# Patient Record
Sex: FEMALE | Race: WHITE | NOT HISPANIC OR LATINO | URBAN - METROPOLITAN AREA
[De-identification: names, ages, dates, MRNs, and addresses within clinical notes are randomized per-mention and may not be internally consistent; named-entity substitution may affect disease eponyms.]

---

## 2023-11-03 DIAGNOSIS — F41.9 ANXIETY: Primary | ICD-10-CM

## 2023-11-03 RX ORDER — ALPRAZOLAM 0.5 MG/1
0.5 TABLET ORAL 3 TIMES DAILY PRN
Qty: 90 TABLET | Refills: 5 | Status: SHIPPED | OUTPATIENT
Start: 2023-11-03 | End: 2024-05-08 | Stop reason: SDUPTHER

## 2023-11-03 RX ORDER — ALPRAZOLAM 0.5 MG/1
0.5 TABLET ORAL 3 TIMES DAILY PRN
COMMUNITY
End: 2023-11-03 | Stop reason: SDUPTHER

## 2024-02-16 DIAGNOSIS — I10 HYPERTENSION, UNSPECIFIED TYPE: Primary | ICD-10-CM

## 2024-02-16 DIAGNOSIS — B00.9 HSV-2 (HERPES SIMPLEX VIRUS 2) INFECTION: ICD-10-CM

## 2024-02-16 DIAGNOSIS — F41.9 ANXIETY: ICD-10-CM

## 2024-02-16 RX ORDER — VENLAFAXINE HYDROCHLORIDE 75 MG/1
75 CAPSULE, EXTENDED RELEASE ORAL DAILY
COMMUNITY
End: 2024-02-16 | Stop reason: SDUPTHER

## 2024-02-16 RX ORDER — LOSARTAN POTASSIUM 50 MG/1
50 TABLET ORAL DAILY
COMMUNITY
End: 2024-02-16 | Stop reason: SDUPTHER

## 2024-02-16 RX ORDER — VALACYCLOVIR HYDROCHLORIDE 500 MG/1
500 TABLET, FILM COATED ORAL DAILY
Qty: 90 TABLET | Refills: 3 | Status: SHIPPED | OUTPATIENT
Start: 2024-02-16

## 2024-02-16 RX ORDER — VENLAFAXINE HYDROCHLORIDE 75 MG/1
75 CAPSULE, EXTENDED RELEASE ORAL DAILY
Qty: 90 CAPSULE | Refills: 3 | Status: SHIPPED | OUTPATIENT
Start: 2024-02-16

## 2024-02-16 RX ORDER — LOSARTAN POTASSIUM 50 MG/1
50 TABLET ORAL DAILY
Qty: 90 TABLET | Refills: 3 | Status: SHIPPED | OUTPATIENT
Start: 2024-02-16

## 2024-02-16 RX ORDER — HYDROCHLOROTHIAZIDE 50 MG/1
50 TABLET ORAL DAILY
COMMUNITY
End: 2024-02-16 | Stop reason: SDUPTHER

## 2024-02-16 RX ORDER — HYDROCHLOROTHIAZIDE 50 MG/1
50 TABLET ORAL DAILY
Qty: 90 TABLET | Refills: 3 | Status: SHIPPED | OUTPATIENT
Start: 2024-02-16

## 2024-02-16 RX ORDER — VALACYCLOVIR HYDROCHLORIDE 500 MG/1
500 TABLET, FILM COATED ORAL DAILY
COMMUNITY
End: 2024-02-16 | Stop reason: SDUPTHER

## 2024-05-08 DIAGNOSIS — F41.9 ANXIETY: ICD-10-CM

## 2024-05-08 RX ORDER — ALPRAZOLAM 0.5 MG/1
0.5 TABLET ORAL 3 TIMES DAILY PRN
Qty: 90 TABLET | Refills: 5 | Status: SHIPPED | OUTPATIENT
Start: 2024-05-08

## 2024-07-09 ENCOUNTER — LAB REQUISITION (OUTPATIENT)
Dept: LAB | Facility: LAB | Age: 54
End: 2024-07-09
Payer: COMMERCIAL

## 2024-07-09 DIAGNOSIS — R53.83 OTHER FATIGUE: ICD-10-CM

## 2024-07-09 LAB
ALBUMIN SERPL BCP-MCNC: 4.7 G/DL (ref 3.4–5)
ALP SERPL-CCNC: 50 U/L (ref 33–110)
ALT SERPL W P-5'-P-CCNC: 30 U/L (ref 7–45)
ANION GAP SERPL CALC-SCNC: 15 MMOL/L (ref 10–20)
AST SERPL W P-5'-P-CCNC: 23 U/L (ref 9–39)
BILIRUB SERPL-MCNC: 0.3 MG/DL (ref 0–1.2)
BUN SERPL-MCNC: 13 MG/DL (ref 6–23)
CALCIUM SERPL-MCNC: 10 MG/DL (ref 8.6–10.6)
CHLORIDE SERPL-SCNC: 98 MMOL/L (ref 98–107)
CHOLEST SERPL-MCNC: 239 MG/DL (ref 0–199)
CHOLESTEROL/HDL RATIO: 3.6
CO2 SERPL-SCNC: 29 MMOL/L (ref 21–32)
CREAT SERPL-MCNC: 0.9 MG/DL (ref 0.5–1.05)
EGFRCR SERPLBLD CKD-EPI 2021: 75 ML/MIN/1.73M*2
ERYTHROCYTE [DISTWIDTH] IN BLOOD BY AUTOMATED COUNT: 12.4 % (ref 11.5–14.5)
GLUCOSE SERPL-MCNC: 96 MG/DL (ref 74–99)
HCT VFR BLD AUTO: 41.5 % (ref 36–46)
HDLC SERPL-MCNC: 66.1 MG/DL
HGB BLD-MCNC: 13.2 G/DL (ref 12–16)
LDLC SERPL CALC-MCNC: 152 MG/DL
MCH RBC QN AUTO: 29.4 PG (ref 26–34)
MCHC RBC AUTO-ENTMCNC: 31.8 G/DL (ref 32–36)
MCV RBC AUTO: 92 FL (ref 80–100)
NON HDL CHOLESTEROL: 173 MG/DL (ref 0–149)
NRBC BLD-RTO: 0 /100 WBCS (ref 0–0)
PLATELET # BLD AUTO: 340 X10*3/UL (ref 150–450)
POTASSIUM SERPL-SCNC: 4 MMOL/L (ref 3.5–5.3)
PROT SERPL-MCNC: 7 G/DL (ref 6.4–8.2)
RBC # BLD AUTO: 4.49 X10*6/UL (ref 4–5.2)
SODIUM SERPL-SCNC: 138 MMOL/L (ref 136–145)
TRIGL SERPL-MCNC: 103 MG/DL (ref 0–149)
TSH SERPL-ACNC: 2.41 MIU/L (ref 0.44–3.98)
VLDL: 21 MG/DL (ref 0–40)
WBC # BLD AUTO: 6.3 X10*3/UL (ref 4.4–11.3)

## 2024-07-09 PROCEDURE — 85027 COMPLETE CBC AUTOMATED: CPT

## 2024-07-09 PROCEDURE — 80053 COMPREHEN METABOLIC PANEL: CPT

## 2024-07-09 PROCEDURE — 84443 ASSAY THYROID STIM HORMONE: CPT

## 2024-07-09 PROCEDURE — 80061 LIPID PANEL: CPT

## 2024-07-12 DIAGNOSIS — N95.2 VAGINAL ATROPHY: ICD-10-CM

## 2024-07-12 DIAGNOSIS — Z78.0 POST-MENOPAUSAL: Primary | ICD-10-CM

## 2024-07-12 DIAGNOSIS — Z13.9 SCREENING FOR CONDITION: ICD-10-CM

## 2024-08-05 DIAGNOSIS — Z78.0 POST-MENOPAUSAL: ICD-10-CM

## 2024-08-05 DIAGNOSIS — N95.2 VAGINAL ATROPHY: ICD-10-CM

## 2024-08-07 ENCOUNTER — APPOINTMENT (OUTPATIENT)
Dept: RADIOLOGY | Facility: HOSPITAL | Age: 54
End: 2024-08-07
Payer: COMMERCIAL

## 2024-09-04 ENCOUNTER — APPOINTMENT (OUTPATIENT)
Dept: RADIOLOGY | Facility: HOSPITAL | Age: 54
End: 2024-09-04
Payer: COMMERCIAL

## 2024-09-19 ENCOUNTER — APPOINTMENT (OUTPATIENT)
Dept: RADIOLOGY | Facility: HOSPITAL | Age: 54
End: 2024-09-19
Payer: COMMERCIAL

## 2024-09-20 ENCOUNTER — APPOINTMENT (OUTPATIENT)
Dept: RADIOLOGY | Facility: HOSPITAL | Age: 54
End: 2024-09-20
Payer: COMMERCIAL

## 2024-11-07 ENCOUNTER — HOSPITAL ENCOUNTER (OUTPATIENT)
Dept: RADIOLOGY | Facility: CLINIC | Age: 54
Discharge: HOME | End: 2024-11-07
Payer: COMMERCIAL

## 2024-11-07 DIAGNOSIS — M54.50 LUMBAR SPINE PAIN: ICD-10-CM

## 2024-11-07 PROCEDURE — 72148 MRI LUMBAR SPINE W/O DYE: CPT

## 2024-11-22 ENCOUNTER — OFFICE VISIT (OUTPATIENT)
Dept: ORTHOPEDIC SURGERY | Facility: CLINIC | Age: 54
End: 2024-11-22
Payer: COMMERCIAL

## 2024-11-22 DIAGNOSIS — M54.16 LUMBAR RADICULOPATHY: Primary | ICD-10-CM

## 2024-11-22 DIAGNOSIS — M51.26 DISPLACEMENT OF LUMBAR INTERVERTEBRAL DISC WITHOUT MYELOPATHY: ICD-10-CM

## 2024-11-22 PROCEDURE — 99214 OFFICE O/P EST MOD 30 MIN: CPT | Performed by: ORTHOPAEDIC SURGERY

## 2024-11-22 NOTE — LETTER
November 22, 2024     Gabriel Aragon MD  [address]  [address]  [facility] [address]    Patient: Mamta Coleman   YOB: 1970   Date of Visit: 11/22/2024       Dear Dr. Gabriel Aragon MD:    Thank you for referring Mamta Coleman to me for evaluation. Below are my notes for this consultation.  If you have questions, please do not hesitate to call me. I look forward to following your patient along with you.       Sincerely,     Horacio Reyna MD      CC: No Recipients  ______________________________________________________________________________________    HPI:Mamta Coleman is a 54-year-old woman with a past medical history significant for prior microdiscectomy 2001.  Approximately 4 months ago, she started had the onset of left buttock and leg pain.  It is variable in intensity.  It was worse when she was doing some yoga so she backed off of that.  She has not had physical therapy or steroid injections or other treatment.      ROS:  Reviewed on EMR and patient intake sheet.    PMH/SH:  Reviewed on EMR and patient intake sheet.    Exam:  Physical Exam    Constitutional: Well appearing; no acute distress  Eyes: pupils are equal and round  Psych: normal affect  Respiratory: non-labored breathing  Cardiovascular: regular rate and rhythm  GI: non-distended abdomen  Musculoskeletal: no pain with range of motion of the hips bilaterally  Neurologic: [4+]/5 strength in the lower extremities bilaterally]; [mildly positive left] straight leg raise    Radiology:     MRI demonstrates a recurrent central to left paracentral L5-S1 disc herniation.  It is slightly displaces the traversing left S1 nerve root but does not compress it.    Diagnosis:    Lumbar radiculopathy; L5-S1 hernia nucleus pulposus    Assessment and Plan:   54-year-old woman with a recurrent L5-S1 disc herniation.  At this time I have encouraged nonoperative management with combination of physical therapy  and steroid injections.  If, and only if her symptoms remain intolerable, then further intervention might be required.  She will follow-up as necessary.  She was appreciative for the conservative, nonoperative approach  The patient was in agreement with the plan. At the end of the visit today, the patient felt that all questions had been answered satisfactorily.  The patient was pleased with the visit and very appreciative for the care rendered.     Thank you very much for the kind referral.  It is a privilege, and a pleasure, to partner with you in the care of your patients.  I would be delighted to assist you with any further consultations as needed.          Horacio Reyna MD    Chief of Spine Surgery, [facility]  Director of Spine Service, [facility]  , Department of Orthopaedics  [facility] [employer] [facility]  [address] [id number]  P: [phone]  [email or web address]    This note was dictated with voice recognition software.  It has not been proofread for grammatical errors, typographical mistakes or other semantic inconsistencies.

## 2024-11-22 NOTE — PROGRESS NOTES
HPI:Mamta Coleman is a 54-year-old woman with a past medical history significant for prior microdiscectomy 2001.  Approximately 4 months ago, she started had the onset of left buttock and leg pain.  It is variable in intensity.  It was worse when she was doing some yoga so she backed off of that.  She has not had physical therapy or steroid injections or other treatment.      ROS:  Reviewed on EMR and patient intake sheet.    PMH/SH:  Reviewed on EMR and patient intake sheet.    Exam:  Physical Exam    Constitutional: Well appearing; no acute distress  Eyes: pupils are equal and round  Psych: normal affect  Respiratory: non-labored breathing  Cardiovascular: regular rate and rhythm  GI: non-distended abdomen  Musculoskeletal: no pain with range of motion of the hips bilaterally  Neurologic: [4+]/5 strength in the lower extremities bilaterally]; [mildly positive left] straight leg raise    Radiology:     MRI demonstrates a recurrent central to left paracentral L5-S1 disc herniation.  It is slightly displaces the traversing left S1 nerve root but does not compress it.    Diagnosis:    Lumbar radiculopathy; L5-S1 hernia nucleus pulposus    Assessment and Plan:   54-year-old woman with a recurrent L5-S1 disc herniation.  At this time I have encouraged nonoperative management with combination of physical therapy and steroid injections.  If, and only if her symptoms remain intolerable, then further intervention might be required.  She will follow-up as necessary.  She was appreciative for the conservative, nonoperative approach  The patient was in agreement with the plan. At the end of the visit today, the patient felt that all questions had been answered satisfactorily.  The patient was pleased with the visit and very appreciative for the care rendered.     Thank you very much for the kind referral.  It is a privilege, and a pleasure, to partner with you in the care of your patients.  I would be delighted to  assist you with any further consultations as needed.          Horacio Reyna MD    Chief of Spine Surgery, [facility]  Director of Spine Service, [facility]  , Department of Orthopaedics  [facility] [employer]  [address] [place] [address]  P: [phone]  [email or web address]    This note was dictated with voice recognition software.  It has not been proofread for grammatical errors, typographical mistakes or other semantic inconsistencies.

## 2024-12-05 ENCOUNTER — APPOINTMENT (OUTPATIENT)
Dept: PAIN MEDICINE | Facility: CLINIC | Age: 54
End: 2024-12-05
Payer: COMMERCIAL

## 2024-12-11 ENCOUNTER — HOSPITAL ENCOUNTER (OUTPATIENT)
Dept: RADIOLOGY | Facility: CLINIC | Age: 54
Discharge: HOME | End: 2024-12-11
Payer: COMMERCIAL

## 2024-12-11 VITALS — WEIGHT: 124 LBS | BODY MASS INDEX: 21.17 KG/M2 | HEIGHT: 64 IN

## 2024-12-11 DIAGNOSIS — Z12.31 SCREENING MAMMOGRAM FOR BREAST CANCER: ICD-10-CM

## 2024-12-11 PROCEDURE — 77063 BREAST TOMOSYNTHESIS BI: CPT | Performed by: RADIOLOGY

## 2024-12-11 PROCEDURE — 77063 BREAST TOMOSYNTHESIS BI: CPT

## 2024-12-11 PROCEDURE — 77067 SCR MAMMO BI INCL CAD: CPT | Performed by: RADIOLOGY

## 2025-05-27 ENCOUNTER — APPOINTMENT (OUTPATIENT)
Dept: OBSTETRICS AND GYNECOLOGY | Facility: CLINIC | Age: 55
End: 2025-05-27
Payer: COMMERCIAL

## 2025-05-27 VITALS
HEIGHT: 63 IN | SYSTOLIC BLOOD PRESSURE: 146 MMHG | BODY MASS INDEX: 20.13 KG/M2 | DIASTOLIC BLOOD PRESSURE: 92 MMHG | WEIGHT: 113.6 LBS

## 2025-05-27 DIAGNOSIS — Z01.419 WELL WOMAN EXAM WITH ROUTINE GYNECOLOGICAL EXAM: ICD-10-CM

## 2025-05-27 DIAGNOSIS — Z78.0 MENOPAUSE: Primary | ICD-10-CM

## 2025-05-27 DIAGNOSIS — Z12.4 ENCOUNTER FOR SCREENING FOR CERVICAL CANCER: ICD-10-CM

## 2025-05-27 PROCEDURE — 3008F BODY MASS INDEX DOCD: CPT | Performed by: OBSTETRICS & GYNECOLOGY

## 2025-05-27 PROCEDURE — 87626 HPV SEP HI-RSK TYP&POOL RSLT: CPT

## 2025-05-27 PROCEDURE — 99386 PREV VISIT NEW AGE 40-64: CPT | Performed by: OBSTETRICS & GYNECOLOGY

## 2025-05-27 PROCEDURE — 88175 CYTOPATH C/V AUTO FLUID REDO: CPT

## 2025-05-27 PROCEDURE — 1036F TOBACCO NON-USER: CPT | Performed by: OBSTETRICS & GYNECOLOGY

## 2025-05-27 RX ORDER — ESTRADIOL 0.05 MG/D
1 FILM, EXTENDED RELEASE TRANSDERMAL 2 TIMES WEEKLY
Qty: 8 PATCH | Refills: 11 | Status: SHIPPED | OUTPATIENT
Start: 2025-05-29 | End: 2026-05-29

## 2025-05-27 RX ORDER — PROGESTERONE 100 MG/1
100 CAPSULE ORAL DAILY
Qty: 30 EACH | Refills: 11 | Status: SHIPPED | OUTPATIENT
Start: 2025-05-27 | End: 2026-05-27

## 2025-05-27 ASSESSMENT — ENCOUNTER SYMPTOMS
ENDOCRINE NEGATIVE: 0
PSYCHIATRIC NEGATIVE: 0
EYES NEGATIVE: 0
CARDIOVASCULAR NEGATIVE: 0
ALLERGIC/IMMUNOLOGIC NEGATIVE: 0
NEUROLOGICAL NEGATIVE: 0
GASTROINTESTINAL NEGATIVE: 0
MUSCULOSKELETAL NEGATIVE: 0
CONSTITUTIONAL NEGATIVE: 0
HEMATOLOGIC/LYMPHATIC NEGATIVE: 0
RESPIRATORY NEGATIVE: 0

## 2025-05-27 ASSESSMENT — PAIN SCALES - GENERAL: PAINLEVEL_OUTOF10: 0-NO PAIN

## 2025-05-27 NOTE — PROGRESS NOTES
Subjective   Patient ID: Mamta Coleman is a 55 y.o. female who presents for New Patient Visit (Here to discuss menopause ).  Pt is on HRT and is doing well   Pt needs a PAP smear     She had a recent mammogram  and         Review of Systems   All other systems reviewed and are negative.      Objective   Physical Exam  Constitutional:       Appearance: She is normal weight.   Pulmonary:      Effort: Pulmonary effort is normal.   Chest:   Breasts:     Right: Normal.      Left: Normal.      Comments: Bilateral implants  Genitourinary:     General: Normal vulva.      Vagina: Normal.      Cervix: Normal.      Uterus: Normal.       Adnexa: Right adnexa normal and left adnexa normal.      Rectum: Normal.   Musculoskeletal:      Cervical back: Neck supple.   Skin:     General: Skin is warm.   Psychiatric:         Attention and Perception: Attention normal.         Mood and Affect: Mood normal.         Behavior: Behavior normal.         Assessment/Plan   Diagnoses and all orders for this visit:  Menopause  -     estradiol (Vivelle-DOT) 0.05 mg/24 hr patch; Place 1 patch over 96 hours on the skin 2 times a week.  -     progesterone (Prometrium) 100 mg capsule; Take 1 capsule (100 mg) by mouth once daily.  -     XR DEXA bone density; Future  Well woman exam with routine gynecological exam  -     BI mammo bilateral screening tomosynthesis; Future           Rosalind Hinds MD 05/27/25 1:38 PM

## 2025-06-05 LAB
CYTOLOGY CMNT CVX/VAG CYTO-IMP: NORMAL
HPV HR 12 DNA GENITAL QL NAA+PROBE: NEGATIVE
HPV HR GENOTYPES PNL CVX NAA+PROBE: NEGATIVE
HPV16 DNA SPEC QL NAA+PROBE: NEGATIVE
HPV18 DNA SPEC QL NAA+PROBE: NEGATIVE
LAB AP HPV GENOTYPE QUESTION: YES
LAB AP HPV HR: NORMAL
LABORATORY COMMENT REPORT: NORMAL
PATH REPORT.TOTAL CANCER: NORMAL

## 2025-06-12 ENCOUNTER — APPOINTMENT (OUTPATIENT)
Dept: RADIOLOGY | Facility: CLINIC | Age: 55
End: 2025-06-12
Payer: COMMERCIAL

## 2025-06-26 ENCOUNTER — HOSPITAL ENCOUNTER (OUTPATIENT)
Dept: RADIOLOGY | Facility: CLINIC | Age: 55
Discharge: HOME | End: 2025-06-26
Payer: COMMERCIAL

## 2025-06-26 DIAGNOSIS — Z78.0 MENOPAUSE: ICD-10-CM

## 2025-06-26 PROCEDURE — 77080 DXA BONE DENSITY AXIAL: CPT

## 2025-06-26 PROCEDURE — 77080 DXA BONE DENSITY AXIAL: CPT | Performed by: RADIOLOGY
